# Patient Record
Sex: FEMALE | Race: WHITE | NOT HISPANIC OR LATINO | Employment: OTHER | URBAN - METROPOLITAN AREA
[De-identification: names, ages, dates, MRNs, and addresses within clinical notes are randomized per-mention and may not be internally consistent; named-entity substitution may affect disease eponyms.]

---

## 2024-06-18 ENCOUNTER — APPOINTMENT (OUTPATIENT)
Dept: RADIOLOGY | Facility: MEDICAL CENTER | Age: 79
End: 2024-06-18
Attending: EMERGENCY MEDICINE
Payer: MEDICARE

## 2024-06-18 ENCOUNTER — HOSPITAL ENCOUNTER (EMERGENCY)
Facility: MEDICAL CENTER | Age: 79
End: 2024-06-18
Attending: EMERGENCY MEDICINE
Payer: MEDICARE

## 2024-06-18 VITALS
TEMPERATURE: 98.1 F | OXYGEN SATURATION: 94 % | HEIGHT: 66 IN | HEART RATE: 64 BPM | SYSTOLIC BLOOD PRESSURE: 150 MMHG | RESPIRATION RATE: 16 BRPM | BODY MASS INDEX: 24.11 KG/M2 | DIASTOLIC BLOOD PRESSURE: 81 MMHG | WEIGHT: 150 LBS

## 2024-06-18 DIAGNOSIS — R29.810 FACIAL DROOP: ICD-10-CM

## 2024-06-18 LAB
ABO + RH BLD: NORMAL
ABO GROUP BLD: NORMAL
ALBUMIN SERPL BCP-MCNC: 4.1 G/DL (ref 3.2–4.9)
ALBUMIN/GLOB SERPL: 1.6 G/DL
ALP SERPL-CCNC: 93 U/L (ref 30–99)
ALT SERPL-CCNC: 17 U/L (ref 2–50)
ANION GAP SERPL CALC-SCNC: 11 MMOL/L (ref 7–16)
APTT PPP: 33.9 SEC (ref 24.7–36)
AST SERPL-CCNC: 30 U/L (ref 12–45)
BASOPHILS # BLD AUTO: 1.3 % (ref 0–1.8)
BASOPHILS # BLD: 0.08 K/UL (ref 0–0.12)
BILIRUB SERPL-MCNC: 0.5 MG/DL (ref 0.1–1.5)
BLD GP AB SCN SERPL QL: NORMAL
BUN SERPL-MCNC: 8 MG/DL (ref 8–22)
CALCIUM ALBUM COR SERPL-MCNC: 9.1 MG/DL (ref 8.5–10.5)
CALCIUM SERPL-MCNC: 9.2 MG/DL (ref 8.5–10.5)
CHLORIDE SERPL-SCNC: 104 MMOL/L (ref 96–112)
CO2 SERPL-SCNC: 24 MMOL/L (ref 20–33)
CREAT SERPL-MCNC: 0.49 MG/DL (ref 0.5–1.4)
EKG IMPRESSION: NORMAL
EOSINOPHIL # BLD AUTO: 0.12 K/UL (ref 0–0.51)
EOSINOPHIL NFR BLD: 2 % (ref 0–6.9)
ERYTHROCYTE [DISTWIDTH] IN BLOOD BY AUTOMATED COUNT: 43.2 FL (ref 35.9–50)
GFR SERPLBLD CREATININE-BSD FMLA CKD-EPI: 95 ML/MIN/1.73 M 2
GLOBULIN SER CALC-MCNC: 2.6 G/DL (ref 1.9–3.5)
GLUCOSE SERPL-MCNC: 101 MG/DL (ref 65–99)
HCT VFR BLD AUTO: 42 % (ref 37–47)
HGB BLD-MCNC: 14.1 G/DL (ref 12–16)
IMM GRANULOCYTES # BLD AUTO: 0.02 K/UL (ref 0–0.11)
IMM GRANULOCYTES NFR BLD AUTO: 0.3 % (ref 0–0.9)
INR PPP: 0.96 (ref 0.87–1.13)
LYMPHOCYTES # BLD AUTO: 1.38 K/UL (ref 1–4.8)
LYMPHOCYTES NFR BLD: 23 % (ref 22–41)
MCH RBC QN AUTO: 30.7 PG (ref 27–33)
MCHC RBC AUTO-ENTMCNC: 33.6 G/DL (ref 32.2–35.5)
MCV RBC AUTO: 91.3 FL (ref 81.4–97.8)
MONOCYTES # BLD AUTO: 0.37 K/UL (ref 0–0.85)
MONOCYTES NFR BLD AUTO: 6.2 % (ref 0–13.4)
NEUTROPHILS # BLD AUTO: 4.02 K/UL (ref 1.82–7.42)
NEUTROPHILS NFR BLD: 67.2 % (ref 44–72)
NRBC # BLD AUTO: 0 K/UL
NRBC BLD-RTO: 0 /100 WBC (ref 0–0.2)
PLATELET # BLD AUTO: 250 K/UL (ref 164–446)
PMV BLD AUTO: 8.5 FL (ref 9–12.9)
POTASSIUM SERPL-SCNC: 4 MMOL/L (ref 3.6–5.5)
PROT SERPL-MCNC: 6.7 G/DL (ref 6–8.2)
PROTHROMBIN TIME: 12.8 SEC (ref 12–14.6)
RBC # BLD AUTO: 4.6 M/UL (ref 4.2–5.4)
RH BLD: NORMAL
SODIUM SERPL-SCNC: 139 MMOL/L (ref 135–145)
TROPONIN T SERPL-MCNC: 8 NG/L (ref 6–19)
WBC # BLD AUTO: 6 K/UL (ref 4.8–10.8)

## 2024-06-18 PROCEDURE — 70450 CT HEAD/BRAIN W/O DYE: CPT

## 2024-06-18 PROCEDURE — 86901 BLOOD TYPING SEROLOGIC RH(D): CPT

## 2024-06-18 PROCEDURE — 96375 TX/PRO/DX INJ NEW DRUG ADDON: CPT | Mod: XU

## 2024-06-18 PROCEDURE — 85025 COMPLETE CBC W/AUTO DIFF WBC: CPT

## 2024-06-18 PROCEDURE — 700117 HCHG RX CONTRAST REV CODE 255: Performed by: EMERGENCY MEDICINE

## 2024-06-18 PROCEDURE — 80053 COMPREHEN METABOLIC PANEL: CPT

## 2024-06-18 PROCEDURE — 84484 ASSAY OF TROPONIN QUANT: CPT

## 2024-06-18 PROCEDURE — 86900 BLOOD TYPING SEROLOGIC ABO: CPT

## 2024-06-18 PROCEDURE — 93005 ELECTROCARDIOGRAM TRACING: CPT | Performed by: EMERGENCY MEDICINE

## 2024-06-18 PROCEDURE — 96374 THER/PROPH/DIAG INJ IV PUSH: CPT | Mod: XU

## 2024-06-18 PROCEDURE — 85730 THROMBOPLASTIN TIME PARTIAL: CPT

## 2024-06-18 PROCEDURE — 700111 HCHG RX REV CODE 636 W/ 250 OVERRIDE (IP): Mod: JZ

## 2024-06-18 PROCEDURE — 70498 CT ANGIOGRAPHY NECK: CPT

## 2024-06-18 PROCEDURE — 36415 COLL VENOUS BLD VENIPUNCTURE: CPT

## 2024-06-18 PROCEDURE — 0042T CT-CEREBRAL PERFUSION ANALYSIS: CPT

## 2024-06-18 PROCEDURE — 85610 PROTHROMBIN TIME: CPT

## 2024-06-18 PROCEDURE — 70496 CT ANGIOGRAPHY HEAD: CPT

## 2024-06-18 PROCEDURE — 86850 RBC ANTIBODY SCREEN: CPT

## 2024-06-18 PROCEDURE — 71045 X-RAY EXAM CHEST 1 VIEW: CPT

## 2024-06-18 PROCEDURE — 99285 EMERGENCY DEPT VISIT HI MDM: CPT

## 2024-06-18 RX ORDER — DIPHENHYDRAMINE HYDROCHLORIDE 50 MG/ML
25 INJECTION INTRAMUSCULAR; INTRAVENOUS ONCE
Status: COMPLETED | OUTPATIENT
Start: 2024-06-18 | End: 2024-06-18

## 2024-06-18 RX ORDER — METHYLPREDNISOLONE SODIUM SUCCINATE 125 MG/2ML
60 INJECTION, POWDER, LYOPHILIZED, FOR SOLUTION INTRAMUSCULAR; INTRAVENOUS ONCE
Status: COMPLETED | OUTPATIENT
Start: 2024-06-18 | End: 2024-06-18

## 2024-06-18 RX ADMIN — METHYLPREDNISOLONE SODIUM SUCCINATE 60 MG: 125 INJECTION, POWDER, FOR SOLUTION INTRAMUSCULAR; INTRAVENOUS at 13:32

## 2024-06-18 RX ADMIN — IOHEXOL 40 ML: 350 INJECTION, SOLUTION INTRAVENOUS at 13:43

## 2024-06-18 RX ADMIN — IOHEXOL 80 ML: 350 INJECTION, SOLUTION INTRAVENOUS at 13:43

## 2024-06-18 RX ADMIN — DIPHENHYDRAMINE HYDROCHLORIDE 25 MG: 50 INJECTION, SOLUTION INTRAMUSCULAR; INTRAVENOUS at 13:33

## 2024-06-18 NOTE — ED NOTES
Pt states family member on the way to come pick her up now.   Ambulatory to restroom with steady gait.

## 2024-06-18 NOTE — DISCHARGE INSTRUCTIONS
Your symptoms are isolated to the face, they have resolved.  Your CT shows no signs of blockage of arteries, and there is no signs of other abnormalities in the lab.  As her symptoms are resolved you are stable for discharge home.  Continue taking your aspirin daily and follow-up with primary care physician return to the emergency room if anything changes or worsens.

## 2024-06-18 NOTE — ED PROVIDER NOTES
"ER Provider Note    Scribed for Moises Justice D.O. by Kris Stafford. 6/18/2024  1:21 PM    Primary Care Provider: No primary care provider noted.    CHIEF COMPLAINT  Chief Complaint   Patient presents with    Possible Stroke     Initial presentation at 1030 then symptoms resolved.   L decreased sensation once more at 1200         HPI/ROS  LIMITATION TO HISTORY   None    OUTSIDE HISTORIAN(S):  EMS present at charge desk and helped provide the history below.     Rhiannon Calzada is a 78 y.o. female who presents to the Emergency Department as a code stroke. Per EMS, the patient had left-sided facial droop and weakness at around 10:30, which resolved and then returned. En route, the patient was also having similar symptoms that also resolved. Per patient, she has a history of ocular migraines that usually last for 5 minutes, however, this episode lasted for around 30 minutes. Following this episode, was novel left-sided facial numbness and drooping, prompting her to call EMS. She was experiencing blurriness and dizziness that have now resolved.     ROS as per HPI.    PAST MEDICAL HISTORY  No past medical history noted.     SURGICAL HISTORY  No past surgical history noted.     FAMILY HISTORY  No family history noted.    SOCIAL HISTORY       CURRENT MEDICATIONS  Previous Medications    No medications noted.       ALLERGIES  Patient has no allergy information on record.    PHYSICAL EXAM  BP (!) 150/81   Pulse 64   Temp 36.7 °C (98.1 °F) (Temporal)   Resp 16   Ht 1.676 m (5' 6\")   Wt 68 kg (150 lb)   SpO2 94%   BMI 24.21 kg/m²     General: No acute distress.  HENT: Normocephalic, Mucus membranes are moist.   Chest: Lungs have even and unlabored respirations, Clear to auscultation.   Cardiovascular: Regular rate and regular rhythm, No peripheral cyanosis.  Abdomen: Non distended.  Neuro:   Orientation: Normal  Facial: No facial droop  Upper extremities: Normal coordination, sensation, and strength.  Lower " extremities: Normal coordination, sensation, and strength.  Visual changes: None  Speech: Clear and concise.  Psychiatric: Calm and cooperative.        INITIAL ASSESSMENT  Patient presents as a code stroke/stroke assessment for concerns of acute stroke that may require thrombolytic therapy or stat interventional radiology procedure to reperfuse the brain. The patient was evaluated at the charge nurse station for initial assessment and brought stat to CT scan to evaluate for vascular occlusion.     Neurology consult not indicated at this time. No objective symptoms. Do not suspect need for intervention or thrombolytic.     Patient is presented as a stroke. She has ocular migraines and visual changes and flashes that are consistent to her prior episodes. It did resolve, and then she had left facial numbness and weakness. She has a history of a TIA in the past with similar symptoms. The patient is being brought STAT to CT for a CTA.    DIAGNOSTIC STUDIES    Labs:   Results for orders placed or performed during the hospital encounter of 06/18/24   CBC WITH DIFFERENTIAL   Result Value Ref Range    WBC 6.0 4.8 - 10.8 K/uL    RBC 4.60 4.20 - 5.40 M/uL    Hemoglobin 14.1 12.0 - 16.0 g/dL    Hematocrit 42.0 37.0 - 47.0 %    MCV 91.3 81.4 - 97.8 fL    MCH 30.7 27.0 - 33.0 pg    MCHC 33.6 32.2 - 35.5 g/dL    RDW 43.2 35.9 - 50.0 fL    Platelet Count 250 164 - 446 K/uL    MPV 8.5 (L) 9.0 - 12.9 fL    Neutrophils-Polys 67.20 44.00 - 72.00 %    Lymphocytes 23.00 22.00 - 41.00 %    Monocytes 6.20 0.00 - 13.40 %    Eosinophils 2.00 0.00 - 6.90 %    Basophils 1.30 0.00 - 1.80 %    Immature Granulocytes 0.30 0.00 - 0.90 %    Nucleated RBC 0.00 0.00 - 0.20 /100 WBC    Neutrophils (Absolute) 4.02 1.82 - 7.42 K/uL    Lymphs (Absolute) 1.38 1.00 - 4.80 K/uL    Monos (Absolute) 0.37 0.00 - 0.85 K/uL    Eos (Absolute) 0.12 0.00 - 0.51 K/uL    Baso (Absolute) 0.08 0.00 - 0.12 K/uL    Immature Granulocytes (abs) 0.02 0.00 - 0.11 K/uL    NRBC  (Absolute) 0.00 K/uL   COMP METABOLIC PANEL   Result Value Ref Range    Sodium 139 135 - 145 mmol/L    Potassium 4.0 3.6 - 5.5 mmol/L    Chloride 104 96 - 112 mmol/L    Co2 24 20 - 33 mmol/L    Anion Gap 11.0 7.0 - 16.0    Glucose 101 (H) 65 - 99 mg/dL    Bun 8 8 - 22 mg/dL    Creatinine 0.49 (L) 0.50 - 1.40 mg/dL    Calcium 9.2 8.5 - 10.5 mg/dL    Correct Calcium 9.1 8.5 - 10.5 mg/dL    AST(SGOT) 30 12 - 45 U/L    ALT(SGPT) 17 2 - 50 U/L    Alkaline Phosphatase 93 30 - 99 U/L    Total Bilirubin 0.5 0.1 - 1.5 mg/dL    Albumin 4.1 3.2 - 4.9 g/dL    Total Protein 6.7 6.0 - 8.2 g/dL    Globulin 2.6 1.9 - 3.5 g/dL    A-G Ratio 1.6 g/dL   PROTHROMBIN TIME   Result Value Ref Range    PT 12.8 12.0 - 14.6 sec    INR 0.96 0.87 - 1.13   APTT   Result Value Ref Range    APTT 33.9 24.7 - 36.0 sec   COD (ADULT)   Result Value Ref Range    ABO Grouping Only O     Rh Grouping Only POS     Antibody Screen-Cod NEG    TROPONIN   Result Value Ref Range    Troponin T 8 6 - 19 ng/L   ABO Rh Confirm   Result Value Ref Range    ABO Rh Confirm O POS    ESTIMATED GFR   Result Value Ref Range    GFR (CKD-EPI) 95 >60 mL/min/1.73 m 2   EKG (NOW)   Result Value Ref Range    Report       [facility]    Test Date:  2024  Pt Name:    KATY FELICIANO              Department: ER  MRN:        [id number]                      Room:        13  Gender:     Female                       Technician: [id number]  :        1945                   Requested By:RAYMOND STEINER  Order #:    [id number]                    David MD: RAYMOND STEINER, D.O.    Measurements  Intervals                                Axis  Rate:       66                           P:          63  NY:         258                          QRS:        -15  QRSD:       95                           T:          16  QT:         430  QTc:        451    Interpretive Statements  Sinus rhythm  Prolonged NY interval  Inferior infarct, old  No previous  ECG available for comparison  Electronically Signed On 2024 14:47:14 PDT by RAYMOND STEINER D.O.          EKG:   Results for orders placed or performed during the hospital encounter of 24   EKG (NOW)   Result Value Ref Range    Report       [facility]    Test Date:  2024  Pt Name:    KATY FELICIANO              Department: ER  MRN:        [id number]                      Room:        13  Gender:     Female                       Technician: [id number]  :        1945                   Requested By:RAYMOND STEINER  Order #:    [id number]                    Reading MD: RAYMOND STEINER D.O.    Measurements  Intervals                                Axis  Rate:       66                           P:          63  CO:         258                          QRS:        -15  QRSD:       95                           T:          16  QT:         430  QTc:        451    Interpretive Statements  Sinus rhythm  Prolonged CO interval  Inferior infarct, old  No previous ECG available for comparison  Electronically Signed On 2024 14:47:14 PDT by RAYMOND STEINER D.O.        I have independently interpreted the above EKG.    Radiology:   The attending emergency physician has independently interpreted the diagnostic imaging associated with this visit and am waiting the final reading from the radiologist.   Preliminary interpretation is as follows: CTA head shows no occlusive disease  Radiologist interpretation:   DX-CHEST-PORTABLE (1 VIEW)   Final Result      No acute cardiac or pulmonary abnormalities are identified.      CT-CTA NECK WITH & W/O-POST PROCESSING   Final Result      1. No evidence of flow-limiting stenosis in the cervical carotid or cervical vertebral arteries.      CT-CTA HEAD WITH & W/O-POST PROCESS   Final Result         1. No hemodynamically significant narrowing of the major intracranial vessels.      CT-CEREBRAL PERFUSION ANALYSIS   Final Result     "  1. Cerebral blood flow less than 30% possibly representing completed infarct = 0 mL.   2. T Max more than 6 seconds possibly representing combination of completed infarct and ischemia = 0 mL.      3. Mismatched volume possibly representing ischemic brain/penumbra= 0 mL      4.  Please note that this cerebral perfusion study and report is Quantitative and targets supratentorial (cerebral) perfusion for evaluation of large vessel territory acute ischemia/infarction. For example, lacunar infarcts, and brainstem/posterior fossa    ischemia/infarction are not evaluated on this study.  Data acquisition is subject to artifacts which can yield non-anatomically plausible perfusion maps which may be due to motion, bolus timing, signal to noise ratio, or other technical factors.    Perfusion map abnormalities which show non-anatomic distributions are likely artifact.   This study is not \"stand-alone\" and should only be utilized for diagnosis, management/treatment in correlation with CT, CTA, and/or MRI and clinical factors.         CT-HEAD W/O   Final Result      1. No acute intracranial abnormality.   2. Atrophy and diffuse chronic microangiopathic white matter changes.   3. Encephalomalacia from an old lacunar infarct adjacent to the frontal horn of the left lateral ventricle.              COURSE & MEDICAL DECISION MAKING     COURSE AND PLAN  1:21 PM - Patient seen and examined at the charge desk as a code stoke. Discussed plan of care, including medication. Patient agrees to the plan of care. The patient will be medicated with solu-medrol 125 MG injection 60 mg and Benadryl injection 25 mg. Ordered for EKG, CT-head w/o, CT-Cerebral perfusion analysis, CT-CTA head with & w/o post process, CT-CTA neck with & post processing, DX-chest, CBC with diff, APTT, Troponin, CMP, ABO Rh Confirm, prothrombin time, and COD (adult) to evaluate her symptoms.      2:46 PM - The patient's symptoms have resolved. She is already taking a " full aspirin everyday. No signs of stroke or a vessel occlusion. She is stable for discharge at home.     ED Summary: This patient presented as a code stroke.  Her symptoms of pretty much resolved on arrival.  The paramedics reportedly saw a left facial droop.  Her symptoms have been isolated to the left face.  She has a history of ocular migraines and this started shortly after that.  CTA was done and shows no occlusive disease.  There is no atrial fibrillation.  She is currently taking aspirin on a daily basis.  She has had TIAs similar to this before.  At this time her symptoms are isolated to the face which is not consistent with significant stroke.  She will not require admission to the hospital and she will be discharged home with for for further evaluation and treatment.    Decision tools and prescription drugs considered including, but not limited to: NIH stroke scale on presentation was 0, NIH stroke scale on reevaluation was 0    CRITICAL CARE  The very real possibility of a deterioration of this patient's condition required the highest level of my preparedness for sudden, emergent intervention. I provided critical care services, which included medication orders, frequent reevaluations of the patient's condition and response to treatment, ordering and reviewing test results, and discussing the case with various consultants. The critical care time associated with the care of the patient was 35 minutes. Review chart for interventions. This time is exclusive of any other billable procedures.        DISPOSITION AND DISCUSSIONS    The patient will return for new or worsening symptoms and is stable at the time of discharge.    The patient is referred to a primary physician for blood pressure management, diabetic screening, and for all other preventative health concerns.    DISPOSITION:  Patient will be discharged home in stable condition.    FOLLOW UP:    Please see your primary doctor in 1 week for reevaluation  and follow-up          OUTPATIENT MEDICATIONS:  New Prescriptions    No medications noted.        FINAL DIAGNOSIS  1. Facial droop        Total Critical Care Time was 35 minutes, as outlined above.     IKris (Scribe), am scribing for, and in the presence of, Moises Justice D.O..    Electronically signed by: Kris Stafford ([place]), 6/18/2024    IMoises D.O. personally performed the services described in this documentation, as scribed by Kris Stafford in my presence, and it is both accurate and complete.     The note accurately reflects work and decisions made by me.  Moises Justice D.O.  6/18/2024  7:14 PM

## 2024-06-18 NOTE — ED TRIAGE NOTES
Chief Complaint   Patient presents with    Possible Stroke     Initial presentation at 1030 then symptoms resolved.   L decreased sensation once more at 1200

## 2024-06-19 NOTE — ED NOTES
DC instructions reviewed with pt. Pt verbalized understanding. Pt ambulated to [facility] with steady gait.